# Patient Record
Sex: FEMALE | Race: WHITE | Employment: UNEMPLOYED | URBAN - METROPOLITAN AREA
[De-identification: names, ages, dates, MRNs, and addresses within clinical notes are randomized per-mention and may not be internally consistent; named-entity substitution may affect disease eponyms.]

---

## 2022-08-25 ENCOUNTER — PROCEDURE VISIT (OUTPATIENT)
Dept: AUDIOLOGY | Age: 57
End: 2022-08-25

## 2022-08-25 DIAGNOSIS — H90.3 SENSORINEURAL HEARING LOSS, BILATERAL: Primary | ICD-10-CM

## 2022-08-25 PROCEDURE — 92552 PURE TONE AUDIOMETRY AIR: CPT | Performed by: AUDIOLOGIST

## 2022-09-15 NOTE — PROGRESS NOTES
This patient was seen for audiometric testing. Patient has a longstanding bilateral sensorineural hearing loss and has worn hearing aids for several years. Patient is in need of new ear molds, per family. Pure tone air conduction audiometry revealed a severe  sensorineural hearing loss, bilaterally. Reliability was fair. The results were reviewed with the patient. Ear mold impressions were taken without incident and will be sent, per family request, to the patient when received.     Bozena Meyers CCC/MARCIE  Audiologist  [id number]  NPI#:  [id number]        Electronically signed by Destinee Mckeon on 9/15/2022 at 11:08 AM

## 2023-09-06 ENCOUNTER — DOCUMENTATION (OUTPATIENT)
Dept: PRIMARY CARE | Facility: CLINIC | Age: 58
End: 2023-09-06
Payer: OTHER GOVERNMENT

## 2023-09-06 DIAGNOSIS — Z12.39 ENCOUNTER FOR SCREENING BREAST EXAMINATION: Primary | ICD-10-CM

## 2023-09-06 PROBLEM — J45.909 ASTHMA (HHS-HCC): Status: ACTIVE | Noted: 2023-09-06

## 2023-09-06 PROBLEM — E78.00 HIGH CHOLESTEROL: Status: ACTIVE | Noted: 2023-09-06

## 2023-09-06 PROBLEM — E11.9 DIABETES MELLITUS (MULTI): Status: ACTIVE | Noted: 2023-09-06

## 2023-10-18 ENCOUNTER — PROCEDURE VISIT (OUTPATIENT)
Dept: AUDIOLOGY | Age: 58
End: 2023-10-18

## 2023-10-18 DIAGNOSIS — H90.3 SENSORINEURAL HEARING LOSS, BILATERAL: Primary | ICD-10-CM

## 2023-10-18 PROCEDURE — 99024 POSTOP FOLLOW-UP VISIT: CPT | Performed by: AUDIOLOGIST

## 2023-10-18 NOTE — PROGRESS NOTES
Ear mold tubing changed. Hearing aids checked and working correctly. Patient to return PRN.     Claire Coles CCC/MARCIE  Audiologist  [id number]  NPI#:  [id number]

## 2023-11-01 NOTE — PROGRESS NOTES
Subjective   Patient ID: Fabiana Melvin is a 57 y.o. female who presents for BCCP at [facility]     BCCP appt at [facility]    Special needs - here with family     Last  WWE  with BCCP  :    2021     Usual PCP :    DR Whittaker   Usual gyn:  DR Saunders     Intake form reviewed with pt   Concerns:    At last appt - concern for no period of  a year and then some bleeding - did see DR Saunders - US was done -   Told all was ok       LMP  -  May - heavy  -   Gets them occas only     History of Pap smears -  for sure with me July 2021 -  WNL   Might have had one with the gyn recently too     Any pelvic or vaginal Symptoms?  NONE     On any birth control? -     G - 0  P - 0     Breastfeeding  -     Last Mammogram :   7/28/21  - WNL   Any breast Symptoms?  :   NONE   Family Hx of BRCA?   :   sister - age 58     Does she do self breast exams -  no       Concerns -        Review of Systems    Objective   There were no vitals taken for this visit.    Physical Exam    Assessment/Plan   Problem List Items Addressed This Visit          Medium    Encounter for screening breast examination - Primary        Encounter for Annual Gyn Exam   BCCP  appt today at [facility] today     Breast exam done today for screen for breast cancer     Screening mammogram today     Discussion with pt about self breast exams,  the need to let us know if she does not hear about test results in a few weeks     Initiate Treatment: TAC 0.1% ointment - Apply to affected areas of trunk and upper/lower extremities BID X 2 weeks , PRN for flares do not apply to face axilla or groin Otc Regimen: CeraVe cream Samples Given: CeraVe healing ointment Detail Level: Zone

## 2023-11-30 ENCOUNTER — PHARMACY VISIT (OUTPATIENT)
Dept: PHARMACY | Facility: CLINIC | Age: 58
End: 2023-11-30
Payer: COMMERCIAL

## 2023-12-01 ENCOUNTER — PHARMACY VISIT (OUTPATIENT)
Dept: PHARMACY | Facility: CLINIC | Age: 58
End: 2023-12-01
Payer: COMMERCIAL

## 2023-12-01 PROCEDURE — RXMED WILLOW AMBULATORY MEDICATION CHARGE

## 2024-01-03 ENCOUNTER — PHARMACY VISIT (OUTPATIENT)
Dept: PHARMACY | Facility: CLINIC | Age: 59
End: 2024-01-03
Payer: COMMERCIAL

## 2024-01-03 PROCEDURE — RXMED WILLOW AMBULATORY MEDICATION CHARGE

## 2024-01-25 ENCOUNTER — LAB REQUISITION (OUTPATIENT)
Dept: LAB | Facility: HOSPITAL | Age: 59
End: 2024-01-25
Payer: OTHER GOVERNMENT

## 2024-01-25 DIAGNOSIS — E11.9 TYPE 2 DIABETES MELLITUS WITHOUT COMPLICATIONS (MULTI): ICD-10-CM

## 2024-01-25 LAB
ALBUMIN SERPL BCP-MCNC: 4 G/DL (ref 3.4–5)
ALP SERPL-CCNC: 51 U/L (ref 33–110)
ALT SERPL W P-5'-P-CCNC: 12 U/L (ref 7–45)
ANION GAP SERPL CALC-SCNC: 17 MMOL/L (ref 10–20)
AST SERPL W P-5'-P-CCNC: 15 U/L (ref 9–39)
BILIRUB SERPL-MCNC: 0.3 MG/DL (ref 0–1.2)
BUN SERPL-MCNC: 18 MG/DL (ref 6–23)
CALCIUM SERPL-MCNC: 8.7 MG/DL (ref 8.6–10.3)
CHLORIDE SERPL-SCNC: 99 MMOL/L (ref 98–107)
CO2 SERPL-SCNC: 25 MMOL/L (ref 21–32)
CREAT SERPL-MCNC: 0.57 MG/DL (ref 0.5–1.05)
EGFRCR SERPLBLD CKD-EPI 2021: >90 ML/MIN/1.73M*2
GLUCOSE SERPL-MCNC: 171 MG/DL (ref 74–99)
POTASSIUM SERPL-SCNC: 4.1 MMOL/L (ref 3.5–5.3)
PROT SERPL-MCNC: 6.5 G/DL (ref 6.4–8.2)
SODIUM SERPL-SCNC: 137 MMOL/L (ref 136–145)

## 2024-01-25 PROCEDURE — 83036 HEMOGLOBIN GLYCOSYLATED A1C: CPT

## 2024-01-25 PROCEDURE — 80053 COMPREHEN METABOLIC PANEL: CPT

## 2024-01-26 LAB
EST. AVERAGE GLUCOSE BLD GHB EST-MCNC: 140 MG/DL
HBA1C MFR BLD: 6.5 %

## 2024-01-28 PROCEDURE — RXMED WILLOW AMBULATORY MEDICATION CHARGE

## 2024-01-29 ENCOUNTER — PHARMACY VISIT (OUTPATIENT)
Dept: PHARMACY | Facility: CLINIC | Age: 59
End: 2024-01-29
Payer: COMMERCIAL

## 2024-04-11 ENCOUNTER — PHARMACY VISIT (OUTPATIENT)
Dept: PHARMACY | Facility: CLINIC | Age: 59
End: 2024-04-11
Payer: COMMERCIAL

## 2024-04-12 PROCEDURE — RXMED WILLOW AMBULATORY MEDICATION CHARGE

## 2024-04-15 ENCOUNTER — PHARMACY VISIT (OUTPATIENT)
Dept: PHARMACY | Facility: CLINIC | Age: 59
End: 2024-04-15
Payer: COMMERCIAL

## 2024-06-17 PROCEDURE — RXMED WILLOW AMBULATORY MEDICATION CHARGE

## 2024-06-18 ENCOUNTER — PHARMACY VISIT (OUTPATIENT)
Dept: PHARMACY | Facility: CLINIC | Age: 59
End: 2024-06-18
Payer: COMMERCIAL

## 2024-08-21 ENCOUNTER — PHARMACY VISIT (OUTPATIENT)
Dept: PHARMACY | Facility: CLINIC | Age: 59
End: 2024-08-21
Payer: COMMERCIAL

## 2024-08-21 PROCEDURE — RXMED WILLOW AMBULATORY MEDICATION CHARGE

## 2024-10-25 PROCEDURE — RXMED WILLOW AMBULATORY MEDICATION CHARGE

## 2024-10-28 ENCOUNTER — PHARMACY VISIT (OUTPATIENT)
Dept: PHARMACY | Facility: CLINIC | Age: 59
End: 2024-10-28
Payer: COMMERCIAL

## 2024-12-16 PROCEDURE — RXMED WILLOW AMBULATORY MEDICATION CHARGE

## 2024-12-17 ENCOUNTER — PHARMACY VISIT (OUTPATIENT)
Dept: PHARMACY | Facility: CLINIC | Age: 59
End: 2024-12-17
Payer: COMMERCIAL

## 2025-01-08 ENCOUNTER — DOCUMENTATION (OUTPATIENT)
Dept: PRIMARY CARE | Facility: CLINIC | Age: 60
End: 2025-01-08
Payer: OTHER GOVERNMENT

## 2025-01-08 DIAGNOSIS — Z12.31 SCREENING MAMMOGRAM, ENCOUNTER FOR: ICD-10-CM

## 2025-01-08 DIAGNOSIS — Z12.39 ENCOUNTER FOR SCREENING BREAST EXAMINATION: Primary | ICD-10-CM

## 2025-01-08 PROBLEM — Z91.89 AT HIGH RISK FOR BREAST CANCER: Status: ACTIVE | Noted: 2025-01-08

## 2025-01-08 NOTE — PROGRESS NOTES
Subjective   Patient ID: Fabiana Melvin is a 59 y.o. female who presents for BCCP at [facility]     BCCP appt at [facility]    Special needs - here with family     Last  WWE  with BCCP  :    2023     Usual PCP :  [facility]   Usual gyn:  DR Saunders     Intake form reviewed with pt     Concerns:  nothing specific       LMP  -  May 2023  - no bleeding since   History of Pap smears -  for sure with me July 2021 -  WNL     Any pelvic or vaginal Symptoms?  NONE     On any birth control? - not needed     G - 0  P - 0    Never sexually active      Breastfeeding  - NA     Last Mammogram :   9/2023   - WNL   Any breast Symptoms?  :   NONE   Family Hx of BRCA?   :   sister - age 58 ,   Mother - ovarian, bilateral  GM - breast, niece  - breast     Does she do self breast exams -  no       Concerns -   NONE       Review of Systems    Objective   There were no vitals taken for this visit.    Physical Exam  Constitutional:       Appearance: Normal appearance. She is obese.      Comments: Limited intelligence   Increase WOB    HENT:      Head: Normocephalic and atraumatic.   Pulmonary:      Breath sounds: Wheezing present.   Chest:      Chest wall: No mass or tenderness.   Breasts:     Flavio Score is 5.      Right: Normal. No mass, nipple discharge, skin change or tenderness.      Left: Normal. No mass, nipple discharge, skin change or tenderness.   Lymphadenopathy:      Upper Body:      Right upper body: No axillary adenopathy.      Left upper body: No axillary adenopathy.   Neurological:      Mental Status: She is alert. Mental status is at baseline.         Assessment/Plan   Problem List Items Addressed This Visit          Medium    Encounter for screening breast examination - Primary    Screening mammogram, encounter for          Encounter for Annual Gyn Exam   BCCP  appt today at [facility] today     Breast exam done today for screen for breast cancer       Screening mammogram today       I did not do pap test -   Too much  resp distress and very low risk       (Spoke to sister  - pt is non compliant with inhalers and her diet - they cannot get her to listen  -   Urged pt to eat less candy and use her inhalers)     Discussion with pt about self breast exams,  the need to let us know if she does not hear about test results in a few weeks